# Patient Record
Sex: FEMALE | Race: WHITE | NOT HISPANIC OR LATINO | Employment: OTHER | URBAN - METROPOLITAN AREA
[De-identification: names, ages, dates, MRNs, and addresses within clinical notes are randomized per-mention and may not be internally consistent; named-entity substitution may affect disease eponyms.]

---

## 2018-09-20 ENCOUNTER — OFFICE VISIT (OUTPATIENT)
Dept: ORTHOPEDIC SURGERY | Facility: CLINIC | Age: 73
End: 2018-09-20

## 2018-09-20 VITALS — BODY MASS INDEX: 28.83 KG/M2 | OXYGEN SATURATION: 94 % | HEIGHT: 64 IN | HEART RATE: 86 BPM | WEIGHT: 168.87 LBS

## 2018-09-20 DIAGNOSIS — M25.532 LEFT WRIST PAIN: Primary | ICD-10-CM

## 2018-09-20 DIAGNOSIS — M65.4 DE QUERVAIN'S DISEASE (RADIAL STYLOID TENOSYNOVITIS): ICD-10-CM

## 2018-09-20 PROCEDURE — 99204 OFFICE O/P NEW MOD 45 MIN: CPT | Performed by: ORTHOPAEDIC SURGERY

## 2018-09-20 PROCEDURE — 20550 NJX 1 TENDON SHEATH/LIGAMENT: CPT | Performed by: ORTHOPAEDIC SURGERY

## 2018-09-20 RX ORDER — TRIAMCINOLONE ACETONIDE 40 MG/ML
20 INJECTION, SUSPENSION INTRA-ARTICULAR; INTRAMUSCULAR
Status: COMPLETED | OUTPATIENT
Start: 2018-09-20 | End: 2018-09-20

## 2018-09-20 RX ORDER — ESCITALOPRAM OXALATE 10 MG/1
10 TABLET ORAL DAILY
Refills: 0 | COMMUNITY
Start: 2018-09-14

## 2018-09-20 RX ORDER — LEVOTHYROXINE SODIUM 0.05 MG/1
50 TABLET ORAL
COMMUNITY
Start: 2018-06-04

## 2018-09-20 RX ORDER — MONTELUKAST SODIUM 4 MG/1
1 TABLET, CHEWABLE ORAL EVERY 8 HOURS PRN
Refills: 1 | COMMUNITY
Start: 2018-07-05

## 2018-09-20 RX ORDER — ASPIRIN 81 MG/1
81 TABLET, CHEWABLE ORAL
COMMUNITY

## 2018-09-20 RX ORDER — MULTIVITAMIN
TABLET ORAL
COMMUNITY

## 2018-09-20 RX ORDER — LIDOCAINE HYDROCHLORIDE 10 MG/ML
1 INJECTION, SOLUTION INFILTRATION; PERINEURAL
Status: COMPLETED | OUTPATIENT
Start: 2018-09-20 | End: 2018-09-20

## 2018-09-20 RX ORDER — OMEGA-3S/DHA/EPA/FISH OIL/D3 300MG-1000
400 CAPSULE ORAL
COMMUNITY

## 2018-09-20 RX ADMIN — TRIAMCINOLONE ACETONIDE 20 MG: 40 INJECTION, SUSPENSION INTRA-ARTICULAR; INTRAMUSCULAR at 11:12

## 2018-09-20 RX ADMIN — LIDOCAINE HYDROCHLORIDE 1 ML: 10 INJECTION, SOLUTION INFILTRATION; PERINEURAL at 11:12

## 2018-09-20 NOTE — PROGRESS NOTES
[facility] Note    Subjective     Pain of the Left Wrist (Left wrist pain x 1 month, pain is more of an ache and soreness, lifting is painful, 5/10 pain scale, has tried icing and oils for inflammation with no help from it.  )      HARIKA Hatfield is a 73 y.o. female.  Patient's here today for new problem today regarding her left wrist.  She is a patient.  She had a similar type condition on the contralateral wrist.  She was seen by Dr. Lopez for that problem.  She has developed left wrist soreness for about 1 month.  She is not used any injections.  She has taken over-the-counter medication.  She has used a brace which helps maybe a little bit but also contributes to some soreness.     History reviewed. No pertinent past medical history.   Past Surgical History:   Procedure Laterality Date   • CHOLECYSTECTOMY        Family History   Problem Relation Age of Onset   • Stroke Mother    • Diabetes Mother      Social History     Social History   • Marital status:      Spouse name: N/A   • Number of children: N/A   • Years of education: N/A     Occupational History   • Not on file.     Social History Main Topics   • Smoking status: Never Smoker   • Smokeless tobacco: Never Used   • Alcohol use No   • Drug use: No   • Sexual activity: Defer     Other Topics Concern   • Not on file     Social History Narrative   • No narrative on file      No current outpatient prescriptions on file prior to visit.     No current facility-administered medications on file prior to visit.       No Known Allergies     The following portions of the patient's history were reviewed and updated as appropriate: allergies, current medications, past family history, past medical history, past social history, past surgical history and problem list.    Review of Systems   Constitutional: Negative.    HENT: Negative.    Eyes: Positive for pain.   Respiratory: Negative.    Cardiovascular: Negative.   "  Gastrointestinal: Negative.    Endocrine: Negative.    Genitourinary: Negative.    Musculoskeletal: Positive for arthralgias.   Skin: Negative.    Allergic/Immunologic: Negative.    Neurological: Negative.    Hematological: Negative.    Psychiatric/Behavioral: Negative.         Objective      Physical Exam  Pulse 86   Ht 161.5 cm (63.58\")   Wt 76.6 kg (168 lb 14 oz)   SpO2 94%   BMI 29.37 kg/m²     Body mass index is 29.37 kg/m².    General  Mental Status - alert  General Appearance - cooperative, well groomed, not in acute distress  Orientation - Oriented X3  Build & Nutrition - well developed and well nourished  Posture - normal posture  Gait - normal gait     Integumentary  Global Assessment  Examination of related systems reveals - no lymphadenopathy  Ears:  No abnormality  Nose:  No mucous drainage  General Characteristics  Overall examination of the patient's skin reveals - no rashes, no evidence of scars, no suspicious lesions and no bruises.  Color - normal coloration of skin.  Vascular: Brisk capillary refill in all extremities    Ortho Exam  Peripheral Vascular   Bilateral Upper Extremity    No cyanotic nail beds    Pink nail beds and rapid capillary refill   Palpation    Radial Pulse - Bilaterally normal    Neurologic   Sensory: Light touch intact- Right and left hand    Left Upper Extremity    Left wrist extensors: 5/5    Left wrist flexors: 5/5    Left intrinsics: 5/5   Right Upper Extremity    Right wrist extensors: 5/5    Right wrist flexors: 5/5    Right intrinsics: 5/5    Musculoskeletal     Inspection and Palpation   Left Wrist      Tenderness -none    Swelling - none    Crepitus - none    Muscle tone - no atrophy     Functional Testing:         Left Wrist and Thumb       Finklestein's:  Positive    CMC shuck:  Negative    CMC grind:  Positive    CMC tenderness: Negative    A1 pulley:  Nontender, no nodule       Strength and Tone    Right  strength: good    Left  strength: " good      Imaging/Studies  Imaging Results (last 24 hours)     Procedure Component Value Units Date/Time    XR Wrist 3+ View Left [[id number]] Resulted:  09/20/18 1033     Updated:  09/20/18 1034    Narrative:       Left Wrist X-Ray    Indication: Pain    Views:  AP, Lateral, and Oblique     Comparison: none    Findings:  No fracture  No bony lesion  Normal soft tissues  Normal joint spaces    Impression: Left wrist x-ray for acute bony abnormalities.              Assessment:  1. Left wrist pain    2. De Quervain's disease (radial styloid tenosynovitis)        Plan:  1. Continue over-the-counter medication as needed for discomfort  2. Diagnostic and therapy injection will be given into the first dorsal compartment today  3. We have offered a thumb spica splint and she has politely declined  4. Follow-up in 4-6 weeks and if she is no better, consider release of the first dorsal compartment as an outpatient.      Medical Decision Making  Management Options : over-the-counter medicine and prescription/IM medicine  Data/Risk: radiology tests and independent visualization of imaging, lab tests, or EMG/NCV    Sujit Tsai MD  09/20/18  11:22 AM

## 2018-09-20 NOTE — PROGRESS NOTES
Procedure   Damari's Injection  Date/Time: 9/20/2018 11:12 AM  Consent given by: patient  Site marked: site marked  Timeout: Immediately prior to procedure a time out was called to verify the correct patient, procedure, equipment, support staff and site/side marked as required   Supporting Documentation  Indications: pain   Procedure Details  Location: wrist (Left) -   Preparation: Patient was prepped and draped in the usual sterile fashion  Needle size: 25 G  Approach: Lateral.  Medications administered: 1 mL lidocaine 1 %; 20 mg triamcinolone acetonide 40 MG/ML  Patient tolerance: patient tolerated the procedure well with no immediate complications

## 2018-11-01 ENCOUNTER — OFFICE VISIT (OUTPATIENT)
Dept: ORTHOPEDIC SURGERY | Facility: CLINIC | Age: 73
End: 2018-11-01

## 2018-11-01 VITALS — BODY MASS INDEX: 29.55 KG/M2 | HEART RATE: 98 BPM | HEIGHT: 64 IN | WEIGHT: 173.06 LBS | OXYGEN SATURATION: 98 %

## 2018-11-01 DIAGNOSIS — M25.532 LEFT WRIST PAIN: Primary | ICD-10-CM

## 2018-11-01 DIAGNOSIS — M65.4 DE QUERVAIN'S DISEASE (RADIAL STYLOID TENOSYNOVITIS): ICD-10-CM

## 2018-11-01 PROCEDURE — 99213 OFFICE O/P EST LOW 20 MIN: CPT | Performed by: ORTHOPAEDIC SURGERY

## 2018-11-01 NOTE — PROGRESS NOTES
"    [facility] [place] [facility] Note    Subjective     CC: Follow-up of the Left Wrist (6 week follow up)      HPI    Miriam Hatfield is a 73 y.o. female.  Patient returns to the office today for follow-up of her left de Quervain's tenosynovitis.  She was injected and got complete resolution of her symptoms.  She is doing well overall.       ROS:    Constiutional:Pt denies fever, chills, nausea, or vomiting.  MSK:as above    Objective      Past Medical History  History reviewed. No pertinent past medical history.      Physical Exam  Pulse 98   Ht 161.5 cm (63.58\")   Wt 78.5 kg (173 lb 1 oz)   SpO2 98%   BMI 30.10 kg/m²     Body mass index is 30.1 kg/m².    Patient is well nourished and well developed.        Ortho Exam  Negative Finkelstein's  No blanching over the injection site  EPL 5 out of 5    Assessment:  1. Left wrist pain    2. De Quervain's disease (radial styloid tenosynovitis)        Plan:  1. Recommend over the counter anti-inflammatories for pain and/or swelling  2. We had a long discussion today about treatment options and alternatives moving forward.  Given her positive response to the injection, I think she would be an excellent candidate for first dorsal compartment release in the future if things recur.  Certainly further injections aren't options as well if it's been quite some time before recurrence.  I will see her back when necessary.  3. I spent 15 minutes face to face with the patient  with 10 minutes spent counseling on future treatment considerations such as surgery or further injections.      Sujit Tsai MD  11/01/18  11:24 AM  "